# Patient Record
Sex: FEMALE | Race: BLACK OR AFRICAN AMERICAN | NOT HISPANIC OR LATINO | Employment: FULL TIME | URBAN - METROPOLITAN AREA
[De-identification: names, ages, dates, MRNs, and addresses within clinical notes are randomized per-mention and may not be internally consistent; named-entity substitution may affect disease eponyms.]

---

## 2023-03-08 PROBLEM — H81.399 PERIPHERAL VERTIGO: Status: ACTIVE | Noted: 2023-03-08

## 2023-03-08 PROBLEM — F43.10 POST TRAUMATIC STRESS DISORDER (PTSD): Status: ACTIVE | Noted: 2023-03-08

## 2023-03-08 PROBLEM — L25.0 CONTACT DERMATITIS DUE TO COSMETICS: Status: ACTIVE | Noted: 2023-03-08

## 2023-03-08 PROBLEM — F41.1 GAD (GENERALIZED ANXIETY DISORDER): Status: ACTIVE | Noted: 2023-03-08

## 2023-03-08 PROBLEM — F41.0 PANIC ATTACKS: Status: ACTIVE | Noted: 2023-03-08

## 2023-03-08 PROBLEM — G44.209 MUSCLE TENSION HEADACHE: Status: ACTIVE | Noted: 2023-03-08

## 2023-03-08 PROBLEM — M54.16 LEFT LUMBAR RADICULOPATHY: Status: ACTIVE | Noted: 2023-03-08

## 2023-03-08 PROBLEM — J30.9 ALLERGIC RHINITIS: Status: ACTIVE | Noted: 2023-03-08

## 2023-03-08 PROBLEM — F33.1 MAJOR DEPRESSIVE DISORDER, RECURRENT EPISODE, MODERATE WITH ANXIOUS DISTRESS (MULTI): Status: ACTIVE | Noted: 2023-03-08

## 2023-03-08 PROBLEM — M79.605 CHRONIC PAIN OF LEFT LOWER EXTREMITY: Status: ACTIVE | Noted: 2023-03-08

## 2023-03-08 PROBLEM — G43.909 MIGRAINES: Status: ACTIVE | Noted: 2023-03-08

## 2023-03-08 PROBLEM — L30.9 ECZEMA: Status: ACTIVE | Noted: 2023-03-08

## 2023-03-08 PROBLEM — G89.29 CHRONIC PAIN OF LEFT LOWER EXTREMITY: Status: ACTIVE | Noted: 2023-03-08

## 2023-03-08 PROBLEM — R21 SKIN RASH: Status: ACTIVE | Noted: 2023-03-08

## 2023-03-08 PROBLEM — G57.02 PIRIFORMIS SYNDROME, LEFT: Status: ACTIVE | Noted: 2023-03-08

## 2023-03-08 PROBLEM — M54.50 LOW BACK PAIN: Status: ACTIVE | Noted: 2023-03-08

## 2023-03-08 RX ORDER — MULTIVITAMIN
1 TABLET ORAL DAILY
COMMUNITY

## 2023-03-08 RX ORDER — NAPROXEN 500 MG/1
500 TABLET ORAL EVERY 12 HOURS PRN
COMMUNITY

## 2023-03-08 RX ORDER — ORPHENADRINE CITRATE 100 MG/1
100 TABLET, EXTENDED RELEASE ORAL 2 TIMES DAILY
COMMUNITY

## 2023-03-14 ENCOUNTER — OFFICE VISIT (OUTPATIENT)
Dept: PRIMARY CARE | Facility: CLINIC | Age: 33
End: 2023-03-14
Payer: COMMERCIAL

## 2023-03-14 VITALS
WEIGHT: 142 LBS | RESPIRATION RATE: 16 BRPM | HEART RATE: 78 BPM | DIASTOLIC BLOOD PRESSURE: 83 MMHG | HEIGHT: 61 IN | TEMPERATURE: 98.2 F | BODY MASS INDEX: 26.81 KG/M2 | OXYGEN SATURATION: 99 % | SYSTOLIC BLOOD PRESSURE: 123 MMHG

## 2023-03-14 DIAGNOSIS — F41.1 GAD (GENERALIZED ANXIETY DISORDER): ICD-10-CM

## 2023-03-14 DIAGNOSIS — F33.1 MAJOR DEPRESSIVE DISORDER, RECURRENT EPISODE, MODERATE WITH ANXIOUS DISTRESS (MULTI): ICD-10-CM

## 2023-03-14 DIAGNOSIS — F41.0 PANIC ATTACKS: ICD-10-CM

## 2023-03-14 DIAGNOSIS — R13.10 DYSPHAGIA, UNSPECIFIED TYPE: Primary | ICD-10-CM

## 2023-03-14 DIAGNOSIS — Z11.3 SCREEN FOR STD (SEXUALLY TRANSMITTED DISEASE): ICD-10-CM

## 2023-03-14 PROCEDURE — 1036F TOBACCO NON-USER: CPT | Performed by: FAMILY MEDICINE

## 2023-03-14 PROCEDURE — 87529 HSV DNA AMP PROBE: CPT

## 2023-03-14 PROCEDURE — 99214 OFFICE O/P EST MOD 30 MIN: CPT | Performed by: FAMILY MEDICINE

## 2023-03-14 NOTE — PATIENT INSTRUCTIONS
Labs ordered (STD screen).  Referred to psychology and GI as discussed.    F/U 8 months: Annual physical.

## 2023-03-14 NOTE — PROGRESS NOTES
"Subjective   Patient ID: Jolie Avina is a 33 y.o. female who presents for Hospital Follow-up and discuss support dog .    HPI   Seen at ER 3/2/23 for epigastric pain.  Had labs, ultrasound.  Sx thought to be due to gastritis.  Abdominal pain essentially resolved since ER visit, but it still feels like food is getting stuck in her throat (take about 30 minutes to go down).    H/O Anxiety, Depression, Panic attacks.  No current Tx.  Requests referral to see therapist.  Wants to discuss paperwork for support dog (will discuss w/psych).    Requests STD testing.  Last week her fiance told her that his ex-wife had herpes.  Patient reports a few very small pinpoint bumps in vaginal ware.  Did not think about them at the time, just thought they were due to shaving, but since she found out about her fiance's ex-wife's history of herpes, she also wants to be checked for herpes.    Review of Systems   All other systems reviewed and are negative.  Objective   /83   Pulse 78   Temp 36.8 °C (98.2 °F)   Resp 16   Ht 1.549 m (5' 1\")   Wt 64.4 kg (142 lb)   SpO2 99%   BMI 26.83 kg/m²   Physical Exam  Exam conducted with a chaperone present.   Genitourinary:     Labia:         Right: No rash.         Left: No rash.       Vagina: No vaginal discharge, erythema or tenderness.      Cervix: No discharge, friability, lesion or erythema.      Comments: 3 tiny bumps at base of hair follicles on left mons pubis.  No vesicular skin lesions or ulcers.    Assessment/Plan   Diagnoses and all orders for this visit:  Dysphagia, unspecified type  -     Referral to Gastroenterology; Future  Panic attacks  -     Referral to Psychology; Future  TREVA (generalized anxiety disorder)  -     Referral to Psychology; Future  Major depressive disorder, recurrent episode, moderate with anxious distress (CMS/HCC)  -     Referral to Psychology; Future  Screen for STD (sexually transmitted disease)  -     HIV 1/2 Antigen/Antibody Screen with " Reflex to Confirmation; Future  -     RPR with Titer Syphilis Monitoring; Future  -     Hepatitis C Antibody; Future  -     C. trachomatis / N. gonorrhoeae, DNA probe; Future  -     HSV PCR; Future  -     C. trachomatis / N. gonorrhoeae, DNA probe; Future  -     HSV PCR; Future  -     HSV PCR  Other orders  -     HSV PCR, Skin/Mucosa  -     C. Trachomatis / N. Gonorrhoeae, Amplified Detection    Labs ordered (STD screen).  Referred to psychology and GI as discussed.    F/U 8 months: Annual physical.

## 2023-03-15 LAB
CHLAMYDIA TRACH., AMPLIFIED: NORMAL
HSV 1 PCR QUAL, SKIN/MUCOSA: NOT DETECTED
HSV 2 PCR QUAL, SKIN/MUCOSA: NOT DETECTED
N. GONORRHEA, AMPLIFIED: NORMAL

## 2023-03-21 DIAGNOSIS — Z11.3 SCREEN FOR STD (SEXUALLY TRANSMITTED DISEASE): Primary | ICD-10-CM

## 2023-06-28 ENCOUNTER — OFFICE VISIT (OUTPATIENT)
Dept: PRIMARY CARE | Facility: CLINIC | Age: 33
End: 2023-06-28
Payer: COMMERCIAL

## 2023-06-28 VITALS
DIASTOLIC BLOOD PRESSURE: 80 MMHG | RESPIRATION RATE: 14 BRPM | HEART RATE: 86 BPM | BODY MASS INDEX: 27.3 KG/M2 | SYSTOLIC BLOOD PRESSURE: 121 MMHG | WEIGHT: 144.6 LBS | HEIGHT: 61 IN | OXYGEN SATURATION: 98 %

## 2023-06-28 DIAGNOSIS — G43.109 MIGRAINE WITH AURA AND WITHOUT STATUS MIGRAINOSUS, NOT INTRACTABLE: Primary | ICD-10-CM

## 2023-06-28 DIAGNOSIS — M54.42 CHRONIC LOW BACK PAIN WITH LEFT-SIDED SCIATICA, UNSPECIFIED BACK PAIN LATERALITY: ICD-10-CM

## 2023-06-28 DIAGNOSIS — G89.29 CHRONIC LOW BACK PAIN WITH LEFT-SIDED SCIATICA, UNSPECIFIED BACK PAIN LATERALITY: ICD-10-CM

## 2023-06-28 PROCEDURE — 1036F TOBACCO NON-USER: CPT | Performed by: FAMILY MEDICINE

## 2023-06-28 PROCEDURE — 99214 OFFICE O/P EST MOD 30 MIN: CPT | Performed by: FAMILY MEDICINE

## 2023-06-28 RX ORDER — SUMATRIPTAN SUCCINATE 100 MG/1
100 TABLET ORAL ONCE AS NEEDED
Qty: 30 TABLET | Refills: 1 | Status: SHIPPED | OUTPATIENT
Start: 2023-06-28 | End: 2023-08-22 | Stop reason: SINTOL

## 2023-06-28 NOTE — PATIENT INSTRUCTIONS
Imitrex provided as needed for migraines.  Call, send message through [employer], or return to office prn if these symptoms worsen or fail to improve as anticipated.      Referred to pain management as requested.    F/U 5 months as previously advised: Annual physical.

## 2023-06-28 NOTE — PROGRESS NOTES
"Subjective   Patient ID: Jolie Avina is a 33 y.o. female who presents for Migraine and discuss pain mamangement referral .    HPI   H/O Migraines w/aura (dizziness, blurry vision a few minute before headache).  Typically controlled w/Excedrin, but it has not been as helpful lately  Wants to try a migraine medicine.  No chest pain.    History of chronic back pain (radiates to left foot) since 2015.  Saw pain management in the past.  Had injections.  Requests pain management referral (states she needs referral since last visit > 6  months ago).    Review of Systems  No complaints.    Objective   /80   Pulse 86   Resp 14   Ht 1.549 m (5' 1\")   Wt 65.6 kg (144 lb 9.6 oz)   SpO2 98%   BMI 27.32 kg/m²     Physical Exam  Constitutional:       General: She is not in acute distress.     Appearance: Normal appearance.   HENT:      Head: Normocephalic.      Right Ear: Tympanic membrane normal.      Left Ear: Tympanic membrane normal.      Mouth/Throat:      Pharynx: Oropharynx is clear. No oropharyngeal exudate or posterior oropharyngeal erythema.   Eyes:      Extraocular Movements: Extraocular movements intact.      Conjunctiva/sclera: Conjunctivae normal.      Pupils: Pupils are equal, round, and reactive to light.   Neck:      Vascular: No carotid bruit.   Cardiovascular:      Rate and Rhythm: Normal rate and regular rhythm.      Heart sounds: Normal heart sounds. No murmur heard.     No friction rub. No gallop.   Pulmonary:      Effort: Pulmonary effort is normal.      Breath sounds: Normal breath sounds. No wheezing, rhonchi or rales.   Musculoskeletal:      Cervical back: No rigidity or tenderness.      Comments: +Bilateral lumbar tenderness.  Nnonradicular lumbar pain w/right SLR.   Neurological:      General: No focal deficit present.      Mental Status: She is oriented to person, place, and time.   Psychiatric:         Mood and Affect: Mood normal.         Behavior: Behavior normal. "     Assessment/Plan   Diagnoses and all orders for this visit:  Migraine with aura and without status migrainosus, not intractable  -     SUMAtriptan (Imitrex) 100 mg tablet; Take 1 tablet (100 mg) by mouth 1 time if needed for migraine. May repeat after 2 hours.  Chronic low back pain with left-sided sciatica, unspecified back pain laterality  -     Referral to Pain Medicine; Future    Imitrex provided as needed for migraines.  Call, send message through [employer], or return to office prn if these symptoms worsen or fail to improve as anticipated.      Referred to pain management as requested.    F/U 5 months as previously advised: Annual physical.

## 2023-06-30 ENCOUNTER — APPOINTMENT (OUTPATIENT)
Dept: PRIMARY CARE | Facility: CLINIC | Age: 33
End: 2023-06-30
Payer: COMMERCIAL

## 2023-08-22 ENCOUNTER — OFFICE VISIT (OUTPATIENT)
Dept: PRIMARY CARE | Facility: CLINIC | Age: 33
End: 2023-08-22
Payer: COMMERCIAL

## 2023-08-22 VITALS
OXYGEN SATURATION: 97 % | SYSTOLIC BLOOD PRESSURE: 120 MMHG | BODY MASS INDEX: 26.66 KG/M2 | HEIGHT: 61 IN | DIASTOLIC BLOOD PRESSURE: 82 MMHG | RESPIRATION RATE: 16 BRPM | WEIGHT: 141.2 LBS | HEART RATE: 79 BPM

## 2023-08-22 DIAGNOSIS — G43.109 MIGRAINE WITH AURA AND WITHOUT STATUS MIGRAINOSUS, NOT INTRACTABLE: Primary | ICD-10-CM

## 2023-08-22 PROCEDURE — 1036F TOBACCO NON-USER: CPT | Performed by: FAMILY MEDICINE

## 2023-08-22 PROCEDURE — 99214 OFFICE O/P EST MOD 30 MIN: CPT | Performed by: FAMILY MEDICINE

## 2023-08-22 NOTE — PATIENT INSTRUCTIONS
Stop Imitrex (side effects).  Start Ubrelvy as needed.  Call, send message through [employer], or return to office prn if these symptoms worsen or fail to improve as anticipated.     F/U 3 months as previously advised: Annual physical.

## 2023-08-22 NOTE — PROGRESS NOTES
"Subjective   Patient ID: Jolie Avnia is a 33 y.o. female who presents for discuss migraine med (Causing burning sensation in chest ).    HPI   H/O Migraines w/aura (dizziness, blurry vision a few minute before headache).  Excedrin not as helpful lately.  Imitrex provided at last visit, but caused midline upper chest discomfort.  Wants to try a different migraine medicine.    Review of Systems  Had rash in pubic area (not present at this time).  Prior HSV Cx negative.  States it briefly came back.  Wonders about herpes.  Will make appt if it returns.    Objective   /82   Pulse 79   Resp 16   Ht 1.549 m (5' 1\")   Wt 64 kg (141 lb 3.2 oz)   SpO2 97%   BMI 26.68 kg/m²     Physical Exam  Constitutional:       General: She is not in acute distress.     Appearance: Normal appearance.   Eyes:      Conjunctiva/sclera: Conjunctivae normal.   Cardiovascular:      Rate and Rhythm: Normal rate and regular rhythm.      Heart sounds: Normal heart sounds. No murmur heard.     No friction rub. No gallop.   Pulmonary:      Effort: Pulmonary effort is normal.      Breath sounds: Normal breath sounds. No wheezing, rhonchi or rales.   Skin:     Coloration: Skin is not jaundiced or pale.   Neurological:      Mental Status: She is oriented to person, place, and time.   Psychiatric:         Mood and Affect: Mood normal.         Behavior: Behavior normal.     Assessment/Plan   Diagnoses and all orders for this visit:  Migraine with aura and without status migrainosus, not intractable  -     ubrogepant (Ubrelvy) 100 mg tablet tablet; Take 1 tablet (100 mg) by mouth if needed (migraines).    Stop Imitrex (side effects).  Start Ubrelvy as needed.  Call, send message through [employer], or return to office prn if these symptoms worsen or fail to improve as anticipated.     F/U 3 months as previously advised: Annual physical.   "

## 2024-02-05 ENCOUNTER — OFFICE VISIT (OUTPATIENT)
Dept: PRIMARY CARE | Facility: CLINIC | Age: 34
End: 2024-02-05
Payer: COMMERCIAL

## 2024-02-05 VITALS
HEART RATE: 96 BPM | BODY MASS INDEX: 25.94 KG/M2 | SYSTOLIC BLOOD PRESSURE: 114 MMHG | TEMPERATURE: 98.4 F | WEIGHT: 137.4 LBS | DIASTOLIC BLOOD PRESSURE: 74 MMHG | RESPIRATION RATE: 16 BRPM | HEIGHT: 61 IN | OXYGEN SATURATION: 98 %

## 2024-02-05 DIAGNOSIS — N89.8 VAGINAL IRRITATION: Primary | ICD-10-CM

## 2024-02-05 PROCEDURE — 1036F TOBACCO NON-USER: CPT | Performed by: FAMILY MEDICINE

## 2024-02-05 PROCEDURE — 87800 DETECT AGNT MULT DNA DIREC: CPT

## 2024-02-05 PROCEDURE — 87529 HSV DNA AMP PROBE: CPT

## 2024-02-05 PROCEDURE — 99212 OFFICE O/P EST SF 10 MIN: CPT | Performed by: FAMILY MEDICINE

## 2024-02-05 ASSESSMENT — ENCOUNTER SYMPTOMS
FEVER: 0
NAUSEA: 0
VOMITING: 0
CHILLS: 0
HEADACHES: 0
HEMATURIA: 0
CONSTIPATION: 0
ANOREXIA: 0
ABDOMINAL PAIN: 0
FREQUENCY: 0
BACK PAIN: 0
DIARRHEA: 0
DYSURIA: 0
SORE THROAT: 0
FLANK PAIN: 0

## 2024-02-05 NOTE — LETTER
February 5, 2024     Patient: Jolie Avina   YOB: 1990   Date of Visit: 2/5/2024       To Whom It May Concern:    Jolie Avina was seen in my clinic on 2/5/2024 at 11:45 am. Please excuse Jolie for her absence from work on this day to make the appointment.    If you have any questions or concerns, please don't hesitate to call.         Sincerely,         Yemi Jean MD        CC: No Recipients

## 2024-02-05 NOTE — PROGRESS NOTES
"Subjective   Patient ID: Main Avina is a 34 y.o. female who presents for Follow-up (Pelvic issue).    Feels some irritation inside vaginal area (left side) over the last couple days.  No discharge.  No odor.  No new topical exposures.  No genital itching, genital lesions, menstrual changes, pelvic pain, vaginal   bleeding.    No sexual activity in months.  Declines blood work for STD screen.    Review of Systems   Constitutional:  Negative for chills and fever.   HENT:  Negative for sore throat.    Gastrointestinal:  Negative for abdominal pain, constipation, diarrhea, nausea and vomiting.   Genitourinary:  Negative for dysuria, flank pain, frequency, hematuria, pelvic pain, urgency and vaginal discharge.   Musculoskeletal:  Negative for back pain.   Skin:  Negative for rash.   Neurological:  Negative for headaches.     Objective   /74   Pulse 96   Temp 36.9 °C (98.4 °F)   Resp 16   Ht 1.549 m (5' 1\")   Wt 62.3 kg (137 lb 6.4 oz)   SpO2 98%   BMI 25.96 kg/m²     Physical Exam  Exam conducted with a chaperone present.   Constitutional:       Appearance: Normal appearance.   Genitourinary:     Labia:         Right: No rash or lesion.         Left: No rash or lesion.       Vagina: Vaginal discharge present. No erythema, bleeding or lesions.      Cervix: No friability, erythema or cervical bleeding.   Neurological:      Mental Status: She is oriented to person, place, and time.   Psychiatric:         Mood and Affect: Mood normal.         Behavior: Behavior normal.     Assessment/Plan   Diagnoses and all orders for this visit:  Vaginal irritation  -     HSV PCR, Skin/Mucosa  -     C. Trachomatis / N. Gonorrhoeae, Amplified Detection    Vaginal swabs sent.    Schedule annual wellness visit.   "

## 2024-02-06 LAB
C TRACH RRNA SPEC QL NAA+PROBE: NEGATIVE
HSV1 DNA SKIN QL NAA+PROBE: NOT DETECTED
HSV2 DNA SKIN QL NAA+PROBE: NOT DETECTED
N GONORRHOEA DNA SPEC QL PROBE+SIG AMP: NEGATIVE

## 2024-05-09 ENCOUNTER — OFFICE VISIT (OUTPATIENT)
Dept: PAIN MEDICINE | Facility: HOSPITAL | Age: 34
End: 2024-05-09
Payer: COMMERCIAL

## 2024-05-09 DIAGNOSIS — M54.16 LEFT LUMBAR RADICULOPATHY: ICD-10-CM

## 2024-05-09 DIAGNOSIS — G57.02 PIRIFORMIS SYNDROME, LEFT: ICD-10-CM

## 2024-05-09 PROCEDURE — 99214 OFFICE O/P EST MOD 30 MIN: CPT | Performed by: ANESTHESIOLOGY

## 2024-05-09 ASSESSMENT — PAIN SCALES - GENERAL: PAINLEVEL_OUTOF10: 7

## 2024-05-09 ASSESSMENT — PAIN - FUNCTIONAL ASSESSMENT: PAIN_FUNCTIONAL_ASSESSMENT: 0-10

## 2024-05-09 NOTE — PROGRESS NOTES
"Chief Complaint   Patient presents with    Back Pain     35 y/o female c/o low back and leg pain     History Of Present Illness  Jolie Avina \"Rosemary" is a 34 y.o. female with a past medical history of anxiety, depression, PTSD, migraines who presents with chronic lower back pain and LLE paresthesias.    Patient reports she has had chronic lower back pain for years with intermittent paresthesias down her left leg. She describes the pain as originating in her left lower back and often radiating in her L buttock and down her leg to her L foot. She has intermittent numbness down her left leg into her foot. The pain is worse with prolonged sitting and getting up in the morning. Symptoms are sometimes improved with stretching and physical activity. She has had significant benefit from L piriformis injections in the past. Her last injection was 2/2022 and she states she had >80% relief for close to 6 months. She has engaged in PT in the past with some benefit. She takes OTC NSAIDs for pain when needed. Lumbar MRI in 2018 was largely normal.    The pain causes significant stress in the patient's life, specifically interferes with general activity, mood, walking ability, ability to perform tasks at home and/or work.  PDenies any bowel or bladder incontinence, saddle anesthesia, worsening pain, weakness or falls.     Past Medical History  She has a past medical history of Abnormal weight loss (02/19/2019), Abnormality of plasma protein, unspecified (04/20/2018), Acute vaginitis (11/20/2015), Encounter for administrative examinations, unspecified (01/03/2019), Encounter for fertility testing (05/05/2017), Encounter for gynecological examination (general) (routine) without abnormal findings, Encounter for other general counseling and advice on procreation (04/07/2017), Low back pain, unspecified (07/13/2020), Other general symptoms and signs (07/09/2022), Pain, unspecified (03/01/2018), Pelvic and perineal pain " (11/19/2016), Personal history of diseases of the skin and subcutaneous tissue (10/27/2017), Personal history of other (healed) physical injury and trauma (04/10/2017), Personal history of other diseases of the female genital tract (12/24/2014), Personal history of other diseases of the female genital tract (10/15/2015), Personal history of other diseases of the female genital tract (05/08/2015), Personal history of other diseases of the female genital tract (11/20/2015), Personal history of other diseases of the nervous system and sense organs, Personal history of other diseases of the respiratory system (02/04/2019), Personal history of other mental and behavioral disorders (01/21/2019), Personal history of other specified conditions (06/24/2022), Personal history of other specified conditions (02/04/2019), Personal history of other specified conditions (06/24/2022), Personal history of other specified conditions (07/02/2015), Personal history of other specified conditions (04/20/2018), Presence of spectacles and contact lenses (01/21/2019), Psychophysiologic insomnia (05/11/2016), Sprain of ligaments of lumbar spine, initial encounter (03/01/2018), Strain of muscle, fascia and tendon at neck level, initial encounter (04/01/2017), Unspecified acute conjunctivitis, left eye (05/23/2022), and Unspecified conjunctivitis (09/27/2021).    Surgical History  She has a past surgical history that includes Other surgical history (01/21/2019).     Social History  She reports that she has never smoked. She has never used smokeless tobacco. No history on file for alcohol use and drug use.    Family History  Family History   Problem Relation Name Age of Onset    No Known Problems Mother      Alcohol abuse Father      Depression Paternal Grandmother      Depression Maternal Great-Grandfather      Suicide Attempts Maternal Great-Grandfather      Depression Other Multiple Family Members     Diabetes Other Multiple Family Members      Multiple sclerosis Other Multiple Family Members     Cancer Other Multiple Family Members         Breast (ggm), Colon ca (ggm)        Allergies  Valacyclovir    Review of Symptoms:   Constitutional: Negative for chills, diaphoresis or fever  HENT: Negative for neck swelling  Eyes:.  Negative for eye pain  Respiratory:.  Negative for cough, shortness of breath or wheezing    Cardiovascular:.  Negative for chest pain or palpitations  Gastrointestinal:.  Negative for abdominal pain, nausea and vomiting  Genitourinary:.  Negative for urgency  Musculoskeletal:  Positive for lower back pain. Positive for joint pain. Denies falls within the past 3 months.  Skin: Negative for wounds or itching   Neurological: Negative for dizziness, seizures, loss of consciousness and weakness  Endo/Heme/Allergies: Does not bruise/bleed easily  Psychiatric/Behavioral: Negative for depression. The patient does not appear anxious.       PHYSICAL EXAM  Vitals signs reviewed  Constitutional:       General: Not in acute distress     Appearance: Normal appearance. Not ill-appearing.  HENT:     Head: Normocephalic and atraumatic  Eyes:     Conjunctiva/sclera: Conjunctivae normal  Cardiovascular:     Rate and Rhythm: Normal rate and regular rhythm  Pulmonary:     Effort: No respiratory distress  Abdominal:     Palpations: Abdomen is soft  Musculoskeletal: MOSES  Skin:     General: Skin is warm and dry  Neurological:     General: No focal deficit present  Psychiatric:         Mood and Affect: Mood normal         Behavior: Behavior normal    Advanced Exam   Inspection: No gross deformities, no surgical scars  Palpation: Mild tenderness at bl lower back and L SI joint  ROM: Normal range of motion of the lumbar flexion extension  Motor: 5-5 strength upper and lower extremities  Sensory: Negative for sensory abnormalities in upper and lower extremities  Reflexes: 2+ reflexes bilateral upper and lower extremities  Lumbar: Positive L straight leg  "raise  Sacral: Positive L sided Usman, Positive L sided Gaenslen's  Hip: Negative for pain with anterior, lateral, posterior palpation of hip joints, negative for internal/external rotation of the hip, negative logroll     Last Recorded Vitals  There were no vitals taken for this visit.    Relevant Results  Current Outpatient Medications   Medication Instructions    multivitamin tablet 1 tablet, oral, Daily    naproxen (NAPROSYN) 500 mg, oral, Every 12 hours PRN    orphenadrine (NORFLEX) 100 mg, oral, 2 times daily, Do not crush, chew, or split.     ubrogepant (UBRELVY) 100 mg, oral, As needed       No results found for this or any previous visit from the past 1000 days.     No image results found.       No diagnosis found.     ASSESSMENT/PLAN  Jolie Avina \"Rosemary" is a 34 y.o. female with a past medical history of anxiety, depression, PTSD, migraines who presents with chronic lower back pain and LLE paresthesias. Based on history, available imaging and physical exam, the patient's primary pain etiology is likely due to L piriformis syndrome. She has had significant relief with piriformis injections in the past.       Our plan is as follows:  - Will schedule repeat L piriformis injection.  Procedure, risk, benefits, and alternatives reviewed.  - Continue to participate in physical therapy as well as physician directed home exercises  - Continue pain medications as prescribed       Sarkis Gann MD   PGY2 Anesthesiology  "

## 2024-05-23 ENCOUNTER — APPOINTMENT (OUTPATIENT)
Dept: RADIOLOGY | Facility: HOSPITAL | Age: 34
End: 2024-05-23
Payer: COMMERCIAL

## 2024-05-29 ENCOUNTER — HOSPITAL ENCOUNTER (OUTPATIENT)
Dept: RADIOLOGY | Facility: HOSPITAL | Age: 34
Discharge: HOME | End: 2024-05-29
Payer: COMMERCIAL

## 2024-05-29 VITALS
WEIGHT: 130 LBS | RESPIRATION RATE: 16 BRPM | TEMPERATURE: 98.3 F | OXYGEN SATURATION: 100 % | HEART RATE: 79 BPM | BODY MASS INDEX: 24.55 KG/M2 | SYSTOLIC BLOOD PRESSURE: 109 MMHG | DIASTOLIC BLOOD PRESSURE: 75 MMHG | HEIGHT: 61 IN

## 2024-05-29 DIAGNOSIS — G57.02 PIRIFORMIS SYNDROME, LEFT: ICD-10-CM

## 2024-05-29 PROCEDURE — 7100000010 HC PHASE TWO TIME - EACH INCREMENTAL 1 MINUTE

## 2024-05-29 PROCEDURE — 20552 NJX 1/MLT TRIGGER POINT 1/2: CPT | Mod: LT

## 2024-05-29 PROCEDURE — 2500000004 HC RX 250 GENERAL PHARMACY W/ HCPCS (ALT 636 FOR OP/ED): Performed by: ANESTHESIOLOGY

## 2024-05-29 PROCEDURE — 7100000009 HC PHASE TWO TIME - INITIAL BASE CHARGE

## 2024-05-29 RX ORDER — MIDAZOLAM HYDROCHLORIDE 1 MG/ML
INJECTION INTRAMUSCULAR; INTRAVENOUS
Status: COMPLETED | OUTPATIENT
Start: 2024-05-29 | End: 2024-05-29

## 2024-05-29 RX ADMIN — MIDAZOLAM HYDROCHLORIDE 2 MG: 1 INJECTION INTRAMUSCULAR; INTRAVENOUS at 12:47

## 2024-05-29 ASSESSMENT — PAIN SCALES - GENERAL
PAINLEVEL_OUTOF10: 0 - NO PAIN
PAINLEVEL_OUTOF10: 5 - MODERATE PAIN
PAINLEVEL_OUTOF10: 5 - MODERATE PAIN
PAINLEVEL_OUTOF10: 0 - NO PAIN
PAINLEVEL_OUTOF10: 0 - NO PAIN

## 2024-05-29 ASSESSMENT — PAIN - FUNCTIONAL ASSESSMENT
PAIN_FUNCTIONAL_ASSESSMENT: 0-10

## 2024-05-29 NOTE — H&P
"History Of Present Illness  Jolie Avina \"Rosemary" is a 34 y.o. female presenting with left piriformis pain.     Past Medical History  Past Medical History:   Diagnosis Date    Abnormal weight loss 02/19/2019    Unintentional weight loss    Abnormality of plasma protein, unspecified 04/20/2018    Elevated serum protein level    Acute vaginitis 11/20/2015    Bacterial vaginosis    Encounter for administrative examinations, unspecified 01/03/2019    Administrative encounter    Encounter for fertility testing 05/05/2017    Fertility testing    Encounter for gynecological examination (general) (routine) without abnormal findings     Encounter for annual routine gynecological examination    Encounter for other general counseling and advice on procreation 04/07/2017    Encounter for preconception consultation    Low back pain, unspecified 07/13/2020    Acute exacerbation of chronic low back pain    Other general symptoms and signs 07/09/2022    Flu-like symptoms    Pain, unspecified 03/01/2018    Pain management    Pelvic and perineal pain 11/19/2016    Suprapubic pain, acute    Personal history of diseases of the skin and subcutaneous tissue 10/27/2017    History of contact dermatitis    Personal history of other (healed) physical injury and trauma 04/10/2017    History of back injury    Personal history of other diseases of the female genital tract 12/24/2014    History of vaginal discharge    Personal history of other diseases of the female genital tract 10/15/2015    History of vaginal discharge    Personal history of other diseases of the female genital tract 05/08/2015    History of vaginal discharge    Personal history of other diseases of the female genital tract 11/20/2015    History of vaginitis    Personal history of other diseases of the nervous system and sense organs     History of myopia    Personal history of other diseases of the respiratory system 02/04/2019    History of laryngitis    Personal " history of other mental and behavioral disorders 01/21/2019    History of bulimia nervosa    Personal history of other specified conditions 06/24/2022    History of dysuria    Personal history of other specified conditions 02/04/2019    History of abdominal pain    Personal history of other specified conditions 06/24/2022    History of dysuria    Personal history of other specified conditions 07/02/2015    History of ecchymosis    Personal history of other specified conditions 04/20/2018    History of chronic fatigue    Presence of spectacles and contact lenses 01/21/2019    Wears prescription eyeglasses    Psychophysiologic insomnia 05/11/2016    Chronic insomnia    Sprain of ligaments of lumbar spine, initial encounter 03/01/2018    Lumbar sprain    Strain of muscle, fascia and tendon at neck level, initial encounter 04/01/2017    Strain of neck muscle, initial encounter    Unspecified acute conjunctivitis, left eye 05/23/2022    Acute conjunctivitis of left eye, unspecified acute conjunctivitis type    Unspecified conjunctivitis 09/27/2021    Bilateral conjunctivitis       Surgical History  Past Surgical History:   Procedure Laterality Date    OTHER SURGICAL HISTORY  01/21/2019    No history of surgery        Social History  She reports that she has never smoked. She has never used smokeless tobacco. She reports current alcohol use. She reports that she does not use drugs.    Family History  Family History   Problem Relation Name Age of Onset    No Known Problems Mother      Alcohol abuse Father      Depression Paternal Grandmother      Depression Maternal Great-Grandfather      Suicide Attempts Maternal Great-Grandfather      Depression Other Multiple Family Members     Diabetes Other Multiple Family Members     Multiple sclerosis Other Multiple Family Members     Cancer Other Multiple Family Members         Breast (ggm), Colon ca (ggm)        Allergies  Valacyclovir    Review of Systems   13 point ROS done and  negative except for the above.   Physical Exam     General: NAD, well nourished   Eyes: Non-icteric sclera, EOMI  Ears, Nose, Mouth, and Throat: External ears and nose appear to be without deformity or rash. No lesions or masses noted. Hearing is grossly intact.   Neck: Trachea midline  Respiratory: Nonlabored breathing   Cardiovascular: No JVD  Skin: No rashes or open lesions/ulcers identified on skin.    Last Recorded Vitals  Last menstrual period 05/09/2024.    Relevant Results           Assessment/Plan   Problem List Items Addressed This Visit             ICD-10-CM       Neuro    Piriformis syndrome, left G57.02    Relevant Orders    [place] pain management    Inject Trigger Point, 1 or 2       Plan for left piriformis injection.    Risks, benefits, alternatives to the procedure were discussed in detail and patient was amenable to proceeding.    Pelon Cr MD

## 2024-08-13 ENCOUNTER — APPOINTMENT (OUTPATIENT)
Dept: PRIMARY CARE | Facility: CLINIC | Age: 34
End: 2024-08-13
Payer: COMMERCIAL

## 2024-08-27 ENCOUNTER — APPOINTMENT (OUTPATIENT)
Dept: PRIMARY CARE | Facility: CLINIC | Age: 34
End: 2024-08-27
Payer: COMMERCIAL

## 2024-09-22 ENCOUNTER — APPOINTMENT (OUTPATIENT)
Dept: URGENT CARE | Age: 34
End: 2024-09-22
Payer: COMMERCIAL

## 2024-10-22 ENCOUNTER — APPOINTMENT (OUTPATIENT)
Dept: PRIMARY CARE | Facility: CLINIC | Age: 34
End: 2024-10-22
Payer: COMMERCIAL

## 2024-10-22 VITALS
OXYGEN SATURATION: 98 % | DIASTOLIC BLOOD PRESSURE: 73 MMHG | RESPIRATION RATE: 14 BRPM | HEIGHT: 61 IN | WEIGHT: 127.8 LBS | BODY MASS INDEX: 24.13 KG/M2 | HEART RATE: 84 BPM | SYSTOLIC BLOOD PRESSURE: 107 MMHG

## 2024-10-22 DIAGNOSIS — L70.9 ACNE, UNSPECIFIED ACNE TYPE: Primary | ICD-10-CM

## 2024-10-22 PROCEDURE — 3008F BODY MASS INDEX DOCD: CPT | Performed by: FAMILY MEDICINE

## 2024-10-22 PROCEDURE — 1036F TOBACCO NON-USER: CPT | Performed by: FAMILY MEDICINE

## 2024-10-22 PROCEDURE — 99213 OFFICE O/P EST LOW 20 MIN: CPT | Performed by: FAMILY MEDICINE

## 2024-10-22 NOTE — PATIENT INSTRUCTIONS
Referred to allergist as directed.  Consider following up with GYN to discuss hormones, birth control, etc.  Let me know if you would like a derm referral ([facility]) in the future for second opinion.    Schedule annual wellness visit.

## 2024-10-28 ENCOUNTER — LAB (OUTPATIENT)
Dept: LAB | Facility: LAB | Age: 34
End: 2024-10-28
Payer: COMMERCIAL

## 2024-10-28 ENCOUNTER — APPOINTMENT (OUTPATIENT)
Dept: OBSTETRICS AND GYNECOLOGY | Facility: CLINIC | Age: 34
End: 2024-10-28
Payer: COMMERCIAL

## 2024-10-28 VITALS
SYSTOLIC BLOOD PRESSURE: 90 MMHG | DIASTOLIC BLOOD PRESSURE: 50 MMHG | BODY MASS INDEX: 24.92 KG/M2 | HEIGHT: 61 IN | WEIGHT: 132 LBS

## 2024-10-28 DIAGNOSIS — L70.9 ADULT ACNE: ICD-10-CM

## 2024-10-28 DIAGNOSIS — N93.8 DUB (DYSFUNCTIONAL UTERINE BLEEDING): ICD-10-CM

## 2024-10-28 DIAGNOSIS — Z01.419 WELL WOMAN EXAM WITH ROUTINE GYNECOLOGICAL EXAM: Primary | ICD-10-CM

## 2024-10-28 LAB
DHEA-S SERPL-MCNC: 121 UG/DL (ref 12–379)
FSH SERPL-ACNC: 2.1 IU/L
PROLACTIN SERPL-MCNC: 4.6 UG/L (ref 3–20)
TESTOST SERPL-MCNC: <30 NG/DL (ref 0–70)
TSH SERPL-ACNC: 1.08 MIU/L (ref 0.44–3.98)

## 2024-10-28 PROCEDURE — 84403 ASSAY OF TOTAL TESTOSTERONE: CPT

## 2024-10-28 PROCEDURE — 3008F BODY MASS INDEX DOCD: CPT | Performed by: OBSTETRICS & GYNECOLOGY

## 2024-10-28 PROCEDURE — 82627 DEHYDROEPIANDROSTERONE: CPT

## 2024-10-28 PROCEDURE — 99385 PREV VISIT NEW AGE 18-39: CPT | Performed by: OBSTETRICS & GYNECOLOGY

## 2024-10-28 PROCEDURE — 83001 ASSAY OF GONADOTROPIN (FSH): CPT

## 2024-10-28 PROCEDURE — 1036F TOBACCO NON-USER: CPT | Performed by: OBSTETRICS & GYNECOLOGY

## 2024-10-28 PROCEDURE — 84146 ASSAY OF PROLACTIN: CPT

## 2024-10-28 PROCEDURE — 88175 CYTOPATH C/V AUTO FLUID REDO: CPT

## 2024-10-28 PROCEDURE — 87624 HPV HI-RISK TYP POOLED RSLT: CPT

## 2024-10-28 PROCEDURE — 84443 ASSAY THYROID STIM HORMONE: CPT

## 2024-10-28 PROCEDURE — 36415 COLL VENOUS BLD VENIPUNCTURE: CPT

## 2024-10-29 ENCOUNTER — APPOINTMENT (OUTPATIENT)
Dept: PRIMARY CARE | Facility: CLINIC | Age: 34
End: 2024-10-29
Payer: COMMERCIAL

## 2024-11-01 ENCOUNTER — HOSPITAL ENCOUNTER (OUTPATIENT)
Dept: RADIOLOGY | Facility: CLINIC | Age: 34
Discharge: HOME | End: 2024-11-01
Payer: COMMERCIAL

## 2024-11-01 DIAGNOSIS — N93.8 DUB (DYSFUNCTIONAL UTERINE BLEEDING): ICD-10-CM

## 2024-11-12 ENCOUNTER — APPOINTMENT (OUTPATIENT)
Dept: PRIMARY CARE | Facility: CLINIC | Age: 34
End: 2024-11-12
Payer: COMMERCIAL

## 2025-02-04 ENCOUNTER — APPOINTMENT (OUTPATIENT)
Dept: ALLERGY | Facility: CLINIC | Age: 35
End: 2025-02-04
Payer: COMMERCIAL